# Patient Record
Sex: MALE | Race: ASIAN | NOT HISPANIC OR LATINO | URBAN - METROPOLITAN AREA
[De-identification: names, ages, dates, MRNs, and addresses within clinical notes are randomized per-mention and may not be internally consistent; named-entity substitution may affect disease eponyms.]

---

## 2020-04-03 ENCOUNTER — INPATIENT (INPATIENT)
Facility: HOSPITAL | Age: 50
LOS: 0 days | Discharge: HOME | DRG: 446 | End: 2020-04-04
Attending: TRANSPLANT SURGERY | Admitting: TRANSPLANT SURGERY
Payer: COMMERCIAL

## 2020-04-03 VITALS
WEIGHT: 184.97 LBS | TEMPERATURE: 98 F | RESPIRATION RATE: 19 BRPM | OXYGEN SATURATION: 99 % | SYSTOLIC BLOOD PRESSURE: 141 MMHG | HEIGHT: 71 IN | HEART RATE: 83 BPM | DIASTOLIC BLOOD PRESSURE: 90 MMHG

## 2020-04-03 DIAGNOSIS — K81.0 ACUTE CHOLECYSTITIS: ICD-10-CM

## 2020-04-03 DIAGNOSIS — Z90.49 ACQUIRED ABSENCE OF OTHER SPECIFIED PARTS OF DIGESTIVE TRACT: Chronic | ICD-10-CM

## 2020-04-03 LAB
ALBUMIN SERPL ELPH-MCNC: 4.6 G/DL — SIGNIFICANT CHANGE UP (ref 3.3–5)
ALP SERPL-CCNC: 87 U/L — SIGNIFICANT CHANGE UP (ref 40–120)
ALT FLD-CCNC: 17 U/L — SIGNIFICANT CHANGE UP (ref 10–45)
ANION GAP SERPL CALC-SCNC: 14 MMOL/L — SIGNIFICANT CHANGE UP (ref 5–17)
APTT BLD: 33.9 SEC — SIGNIFICANT CHANGE UP (ref 27.5–36.3)
AST SERPL-CCNC: 20 U/L — SIGNIFICANT CHANGE UP (ref 10–40)
BASOPHILS # BLD AUTO: 0.02 K/UL — SIGNIFICANT CHANGE UP (ref 0–0.2)
BASOPHILS NFR BLD AUTO: 0.2 % — SIGNIFICANT CHANGE UP (ref 0–2)
BILIRUB SERPL-MCNC: 0.9 MG/DL — SIGNIFICANT CHANGE UP (ref 0.2–1.2)
BLD GP AB SCN SERPL QL: NEGATIVE — SIGNIFICANT CHANGE UP
BUN SERPL-MCNC: 17 MG/DL — SIGNIFICANT CHANGE UP (ref 7–23)
CALCIUM SERPL-MCNC: 9.9 MG/DL — SIGNIFICANT CHANGE UP (ref 8.4–10.5)
CHLORIDE SERPL-SCNC: 100 MMOL/L — SIGNIFICANT CHANGE UP (ref 96–108)
CO2 SERPL-SCNC: 24 MMOL/L — SIGNIFICANT CHANGE UP (ref 22–31)
CREAT SERPL-MCNC: 0.88 MG/DL — SIGNIFICANT CHANGE UP (ref 0.5–1.3)
EOSINOPHIL # BLD AUTO: 0.01 K/UL — SIGNIFICANT CHANGE UP (ref 0–0.5)
EOSINOPHIL NFR BLD AUTO: 0.1 % — SIGNIFICANT CHANGE UP (ref 0–6)
GAS PNL BLDV: SIGNIFICANT CHANGE UP
GLUCOSE SERPL-MCNC: 135 MG/DL — HIGH (ref 70–99)
GRAM STN FLD: SIGNIFICANT CHANGE UP
HCT VFR BLD CALC: 45.6 % — SIGNIFICANT CHANGE UP (ref 39–50)
HGB BLD-MCNC: 15.3 G/DL — SIGNIFICANT CHANGE UP (ref 13–17)
IMM GRANULOCYTES NFR BLD AUTO: 0.2 % — SIGNIFICANT CHANGE UP (ref 0–1.5)
INR BLD: 1.06 RATIO — SIGNIFICANT CHANGE UP (ref 0.88–1.16)
LIDOCAIN IGE QN: 25 U/L — SIGNIFICANT CHANGE UP (ref 7–60)
LYMPHOCYTES # BLD AUTO: 1.35 K/UL — SIGNIFICANT CHANGE UP (ref 1–3.3)
LYMPHOCYTES # BLD AUTO: 12.3 % — LOW (ref 13–44)
MCHC RBC-ENTMCNC: 28.7 PG — SIGNIFICANT CHANGE UP (ref 27–34)
MCHC RBC-ENTMCNC: 33.6 GM/DL — SIGNIFICANT CHANGE UP (ref 32–36)
MCV RBC AUTO: 85.4 FL — SIGNIFICANT CHANGE UP (ref 80–100)
MONOCYTES # BLD AUTO: 0.24 K/UL — SIGNIFICANT CHANGE UP (ref 0–0.9)
MONOCYTES NFR BLD AUTO: 2.2 % — SIGNIFICANT CHANGE UP (ref 2–14)
NEUTROPHILS # BLD AUTO: 9.3 K/UL — HIGH (ref 1.8–7.4)
NEUTROPHILS NFR BLD AUTO: 85 % — HIGH (ref 43–77)
NRBC # BLD: 0 /100 WBCS — SIGNIFICANT CHANGE UP (ref 0–0)
PLATELET # BLD AUTO: 253 K/UL — SIGNIFICANT CHANGE UP (ref 150–400)
POTASSIUM SERPL-MCNC: 3.8 MMOL/L — SIGNIFICANT CHANGE UP (ref 3.5–5.3)
POTASSIUM SERPL-SCNC: 3.8 MMOL/L — SIGNIFICANT CHANGE UP (ref 3.5–5.3)
PROT SERPL-MCNC: 8.1 G/DL — SIGNIFICANT CHANGE UP (ref 6–8.3)
PROTHROM AB SERPL-ACNC: 12.1 SEC — SIGNIFICANT CHANGE UP (ref 10–12.9)
RBC # BLD: 5.34 M/UL — SIGNIFICANT CHANGE UP (ref 4.2–5.8)
RBC # FLD: 12.2 % — SIGNIFICANT CHANGE UP (ref 10.3–14.5)
RH IG SCN BLD-IMP: POSITIVE — SIGNIFICANT CHANGE UP
RH IG SCN BLD-IMP: POSITIVE — SIGNIFICANT CHANGE UP
SODIUM SERPL-SCNC: 138 MMOL/L — SIGNIFICANT CHANGE UP (ref 135–145)
SPECIMEN SOURCE: SIGNIFICANT CHANGE UP
WBC # BLD: 10.94 K/UL — HIGH (ref 3.8–10.5)
WBC # FLD AUTO: 10.94 K/UL — HIGH (ref 3.8–10.5)

## 2020-04-03 PROCEDURE — 76705 ECHO EXAM OF ABDOMEN: CPT | Mod: 26,RT

## 2020-04-03 PROCEDURE — 47490 INCISION OF GALLBLADDER: CPT

## 2020-04-03 PROCEDURE — 99221 1ST HOSP IP/OBS SF/LOW 40: CPT

## 2020-04-03 PROCEDURE — 99285 EMERGENCY DEPT VISIT HI MDM: CPT

## 2020-04-03 RX ORDER — ONDANSETRON 8 MG/1
4 TABLET, FILM COATED ORAL ONCE
Refills: 0 | Status: COMPLETED | OUTPATIENT
Start: 2020-04-03 | End: 2020-04-03

## 2020-04-03 RX ORDER — ACETAMINOPHEN 500 MG
975 TABLET ORAL EVERY 6 HOURS
Refills: 0 | Status: DISCONTINUED | OUTPATIENT
Start: 2020-04-03 | End: 2020-04-03

## 2020-04-03 RX ORDER — FAMOTIDINE 10 MG/ML
20 INJECTION INTRAVENOUS ONCE
Refills: 0 | Status: COMPLETED | OUTPATIENT
Start: 2020-04-03 | End: 2020-04-03

## 2020-04-03 RX ORDER — PIPERACILLIN AND TAZOBACTAM 4; .5 G/20ML; G/20ML
3.38 INJECTION, POWDER, LYOPHILIZED, FOR SOLUTION INTRAVENOUS EVERY 8 HOURS
Refills: 0 | Status: DISCONTINUED | OUTPATIENT
Start: 2020-04-03 | End: 2020-04-04

## 2020-04-03 RX ORDER — ACETAMINOPHEN 500 MG
975 TABLET ORAL EVERY 6 HOURS
Refills: 0 | Status: DISCONTINUED | OUTPATIENT
Start: 2020-04-03 | End: 2020-04-04

## 2020-04-03 RX ORDER — MORPHINE SULFATE 50 MG/1
4 CAPSULE, EXTENDED RELEASE ORAL ONCE
Refills: 0 | Status: DISCONTINUED | OUTPATIENT
Start: 2020-04-03 | End: 2020-04-03

## 2020-04-03 RX ORDER — ENOXAPARIN SODIUM 100 MG/ML
40 INJECTION SUBCUTANEOUS DAILY
Refills: 0 | Status: DISCONTINUED | OUTPATIENT
Start: 2020-04-03 | End: 2020-04-04

## 2020-04-03 RX ORDER — OXYCODONE HYDROCHLORIDE 5 MG/1
10 TABLET ORAL EVERY 6 HOURS
Refills: 0 | Status: DISCONTINUED | OUTPATIENT
Start: 2020-04-03 | End: 2020-04-04

## 2020-04-03 RX ORDER — SODIUM CHLORIDE 9 MG/ML
1000 INJECTION, SOLUTION INTRAVENOUS
Refills: 0 | Status: DISCONTINUED | OUTPATIENT
Start: 2020-04-03 | End: 2020-04-03

## 2020-04-03 RX ORDER — PIPERACILLIN AND TAZOBACTAM 4; .5 G/20ML; G/20ML
3.38 INJECTION, POWDER, LYOPHILIZED, FOR SOLUTION INTRAVENOUS ONCE
Refills: 0 | Status: COMPLETED | OUTPATIENT
Start: 2020-04-03 | End: 2020-04-03

## 2020-04-03 RX ORDER — ACETAMINOPHEN 500 MG
650 TABLET ORAL EVERY 6 HOURS
Refills: 0 | Status: DISCONTINUED | OUTPATIENT
Start: 2020-04-03 | End: 2020-04-03

## 2020-04-03 RX ORDER — OXYCODONE HYDROCHLORIDE 5 MG/1
5 TABLET ORAL EVERY 4 HOURS
Refills: 0 | Status: DISCONTINUED | OUTPATIENT
Start: 2020-04-03 | End: 2020-04-04

## 2020-04-03 RX ADMIN — OXYCODONE HYDROCHLORIDE 10 MILLIGRAM(S): 5 TABLET ORAL at 10:57

## 2020-04-03 RX ADMIN — MORPHINE SULFATE 4 MILLIGRAM(S): 50 CAPSULE, EXTENDED RELEASE ORAL at 07:43

## 2020-04-03 RX ADMIN — SODIUM CHLORIDE 75 MILLILITER(S): 9 INJECTION, SOLUTION INTRAVENOUS at 15:55

## 2020-04-03 RX ADMIN — SODIUM CHLORIDE 100 MILLILITER(S): 9 INJECTION, SOLUTION INTRAVENOUS at 10:03

## 2020-04-03 RX ADMIN — Medication 30 MILLILITER(S): at 06:16

## 2020-04-03 RX ADMIN — MORPHINE SULFATE 4 MILLIGRAM(S): 50 CAPSULE, EXTENDED RELEASE ORAL at 06:16

## 2020-04-03 RX ADMIN — PIPERACILLIN AND TAZOBACTAM 3.38 GRAM(S): 4; .5 INJECTION, POWDER, LYOPHILIZED, FOR SOLUTION INTRAVENOUS at 09:00

## 2020-04-03 RX ADMIN — Medication 975 MILLIGRAM(S): at 17:34

## 2020-04-03 RX ADMIN — FAMOTIDINE 20 MILLIGRAM(S): 10 INJECTION INTRAVENOUS at 06:16

## 2020-04-03 RX ADMIN — PIPERACILLIN AND TAZOBACTAM 25 GRAM(S): 4; .5 INJECTION, POWDER, LYOPHILIZED, FOR SOLUTION INTRAVENOUS at 21:13

## 2020-04-03 RX ADMIN — PIPERACILLIN AND TAZOBACTAM 200 GRAM(S): 4; .5 INJECTION, POWDER, LYOPHILIZED, FOR SOLUTION INTRAVENOUS at 08:28

## 2020-04-03 RX ADMIN — ONDANSETRON 4 MILLIGRAM(S): 8 TABLET, FILM COATED ORAL at 06:16

## 2020-04-03 NOTE — H&P ADULT - HISTORY OF PRESENT ILLNESS
[facility] [id number]    HPI:  50 yo otherwise healthy man p/w severe epigastric and RUQ abdominal pain since 11 pm last night. The pain is constant, without radiation, severe, with one episode of associated emesis. He denies fevers, chills, diarrhea, constipation, jaundice, dark urine or pale stools. He has never had pain like this before.    ED Course: afebrile, VSS. Labs wnl, no leukocytosis, LFTs wnl. RUQ US reveals a 2 cm stone in the GB neck and positive sonographic Harris's sign. Received morphine 8 mg, pepcid, zosyn.

## 2020-04-03 NOTE — ED PROVIDER NOTE - PROGRESS NOTE DETAILS
Farideh Cash PGY2  patient reassessed, still has persisting pain, US shows acute cholecystitis. surgery evaluated patient, will admit for IR guided percutaneous removal of stone.

## 2020-04-03 NOTE — ED ADULT NURSE NOTE - NSIMPLEMENTINTERV_GEN_ALL_ED
Implemented All Universal Safety Interventions:  [place] to call system. Call bell, personal items and telephone within reach. Instruct patient to call for assistance. Room bathroom lighting operational. Non-slip footwear when patient is off stretcher. Physically safe environment: no spills, clutter or unnecessary equipment. Stretcher in lowest position, wheels locked, appropriate side rails in place.

## 2020-04-03 NOTE — PROGRESS NOTE ADULT - SUBJECTIVE AND OBJECTIVE BOX
Vascular & Interventional Radiology Post-Procedure Note    Pre-Procedure Diagnosis: Acute Cholecystitis  Post-Procedure Diagnosis: Same as pre.  Indications for Procedure: Large GB neck Calculus     : Zhou HUGHES, Denise HUGHES    Procedure Details/Findings: Successful placement of an 8.5F GB drainage catheter.     Complications: None  Estimated Blood Loss: Minimal  Specimen: 50ml of black-green fluid was aspirated  Contrast: 5ml  Sedation: MAC  Patient Condition/Disposition: Stable. PACU then Floor

## 2020-04-03 NOTE — H&P ADULT - ASSESSMENT
50 yo man presenting with acute cholecystitis.    - admit to [facility], Dr. Santana  - IR consult for perc leslie  - T&S, coags STAT  - IV abx, zosyn  - NPO, IVF  - oral pain control: tylenol, oxy IR  - VTE PPx: lovenox    Patient discussed with Dr. Santana    [facility]  [id number]

## 2020-04-03 NOTE — H&P ADULT - NSHPLABSRESULTS_GEN_ALL_CORE
CBC (04-03 @ 06:31)                              15.3                           10.94<H>  )----------------(  253        85.0<H>% Neutrophils, 12.3<L>% Lymphocytes, ANC: 9.30<H>                              45.6                  BMP (04-03 @ 06:31)             138     |  100     |  17    		Ca++ --      Ca 9.9                ---------------------------------( 135<H>		Mg --                 3.8     |  24      |  0.88  			Ph --        LFTs (04-03 @ 06:31)      TPro 8.1 / Alb 4.6 / TBili 0.9 / DBili -- / AST 20 / ALT 17 / AlkPhos 87      ABG (04-03 @ 06:31)      /  /  /  /  / %     Lactate:   1.9    VBG (04-03 @ 06:31)     7.37 / 51<H> / 30 / 28 / 2.4<H> / 53<L>%      IMAGING:  < from: US Abdomen Upper Quadrant Right (04.03.20 @ 07:11) >    FINDINGS:    Liver: Within normal limits.    Bile ducts: Normal caliber. Common bile duct measures 2 mm.     Gallbladder: Distended with 2.8 cm gallstone at the gallbladder neck. Positive sonographic Harris sign. No wall thickening or pericholecystic free fluid        Pancreas: Visualized portions are within normal limits.    Right kidney: 9.8 cm. No hydronephrosis.    Ascites: None.    IVC: Visualized portions are within normal limits.    IMPRESSION:     Cholelithiasis with distended gallbladder and positive sonographic Harris's sign but without wall thickening or pericholecystic free fluid, the clavicle for acute cholecystitis.     If there is ongoing concern for acute cholecystitis, this could be better evaluated by nuclear medicine HIDA scan.    < end of copied text >

## 2020-04-03 NOTE — H&P ADULT - NSHPPHYSICALEXAM_GEN_ALL_CORE
ICU Vital Signs Last 24 Hrs  T(C): 37 (03 Apr 2020 07:35), Max: 37 (03 Apr 2020 07:35)  T(F): 98.6 (03 Apr 2020 07:35), Max: 98.6 (03 Apr 2020 07:35)  HR: 63 (03 Apr 2020 07:35) (63 - 83)  BP: 127/77 (03 Apr 2020 07:35) (119/79 - 155/87)  BP(mean): --  ABP: --  ABP(mean): --  RR: 18 (03 Apr 2020 07:35) (18 - 19)  SpO2: 100% (03 Apr 2020 07:35) (96% - 100%)    PHYSICAL EXAM:  GEN: NAD, resting quietly  PULM: symmetric chest rise bilaterally, no increased WOB  CV: regular rate, peripheral pulses intact  ABD: soft, non-distended, epigastric and RUQ tenderness, no peritoneal signs; well-healed open appendectomy incision  EXTR: no cyanosis or edema, moving all extremities

## 2020-04-03 NOTE — ED PROVIDER NOTE - ATTENDING CONTRIBUTION TO CARE
pt with sudden onset epigastric/ruq pain with vomiting. on exam, pt is very uncomfortable due to pain with RUQ tenderness but without peritonitis. he is hemodynamically stable. bedisde u/s shows stone at GB neck but no other signs of cholecystitis - will obtain formal ultrasound to ensure no other findings. will send labs, medicate. pt signed out at end of shift with plan to f/u workup as above, re-eval pt, dispo pending workup. differential includes but not limited to pancreatitis, cholecystitis, biliary colic, gastritis, gastroenteritis. lower concern for appy or sbo or colitis.

## 2020-04-03 NOTE — PRE-ANESTHESIA EVALUATION ADULT - NSANTHOSAYNRD_GEN_A_CORE
No. TAHIRA screening performed.  STOP BANG Legend: 0-2 = LOW Risk; 3-4 = INTERMEDIATE Risk; 5-8 = HIGH Risk

## 2020-04-03 NOTE — CHART NOTE - NSCHARTNOTEFT_GEN_A_CORE
Pt returned to [facility] after IR perc. Leslie tube placement  No c/o abdominal pain at this time    Vital Signs Last 24 Hrs  T(C): 37.1 (03 Apr 2020 13:30), Max: 37.1 (03 Apr 2020 10:48)  T(F): 98.8 (03 Apr 2020 12:27), Max: 98.8 (03 Apr 2020 10:48)  HR: 60 (03 Apr 2020 13:30) (60 - 83)  BP: 134/77 (03 Apr 2020 13:30) (119/79 - 155/87)  BP(mean): --  RR: 18 (03 Apr 2020 13:30) (18 - 19)  SpO2: 98% (03 Apr 2020 13:30) (96% - 100%)    Gen: Alert and appropriate, non toxic appearing  Abd soft.  ND NT RUQ biliary drain in place - green bilious output in bag. Dressing site clean and dry      #Ac. cholecystitis s/p IR perc leslie drain placement POD#0  discussed with [facility] team  -Continue IV Zosyn; possible change to PO abx tomorrow  -Ok for PO diet (halal)  -IVF, can d/c once tolerating PO diet  -Pain management; ATC Tylenol with prn oxycodone  -drain teaching  -Monitor drain output Q shift  -D/C planning for tomorrow  -Surgery team will come up to see pt later today      Eagle Rodrigues PA-C    [facility] - [phone]

## 2020-04-03 NOTE — PROGRESS NOTE ADULT - SUBJECTIVE AND OBJECTIVE BOX
Vascular & Interventional Radiology Pre-Procedure Note    Procedure Name: Percutaneous Cholecystostomy    HPI: 49y Male with acute cholecystitis presenting for percutaneous cholecystostomy.     Allergies: NKDA    Medications:    piperacillin/tazobactam IVPB...: 200 mL/Hr IV Intermittent (04-03 @ 08:28)    Data:  180.34  83.9  T(C): 37.1  HR: 63  BP: 126/78  RR: 18  SpO2: 99%    -WBC 10.94 / HgB 15.3 / Hct 45.6 / Plt 253  -Na 138 / Cl 100 / BUN 17 / Glucose 135  -K 3.8 / CO2 24 / Cr 0.88  -ALT 17 / Alk Phos 87 / T.Bili 0.9  -INR1.06    Imaging: US Abdomen on 04/03/2020    Plan:   -49y Male presents for percutaneous cholecystostomy.   -Risks/Benefits/alternatives explained with the patient and witnessed informed consent obtained.   -Percutaneous cholecystostomy is being performed in this particular situation as the patient has calculous cholecystitis and due to the COVID pandemic resulting in a moratorium on surgical procedures such as a laparoscopic cholecystostomy.

## 2020-04-03 NOTE — ED PROVIDER NOTE - NS ED ROS FT
GENERAL: No fever or chills, EYES: no change in vision, HEENT: no trouble speaking, CARDIAC: no chest pain, palpitation PULMONARY: no cough or SOB, GI: + abdominal pain, + nausea, + vomiting, no diarrhea or constipation, : No changes in urination, SKIN: no rashes, NEURO: no headache,  MSK: No muscle pain ~Kendy Arroyo MD

## 2020-04-03 NOTE — CHART NOTE - NSCHARTNOTEFT_GEN_A_CORE
SURGERY POST-PROCEDURE NOTE    S/P: Percutaneous Cholecystostomy    Subjective:   Patient doing well. Denies having any pain. No nausea, no emesis. Tolerating clears. Appreciative of care. Denies fevers, chills, nausea, emesis, chest pain, or shortness of breath.    Objective:   Vital Signs  T(C): 37.1 (04-03 @ 17:44), Max: 37.1 (04-03 @ 10:48)  HR: 61 (04-03 @ 17:44) (60 - 83)  BP: 99/69 (04-03 @ 17:44) (99/69 - 155/87)  RR: 18 (04-03 @ 17:44) (18 - 19)  SpO2: 98% (04-03 @ 17:44) (96% - 100%)  04-03-20 @ 07:01  -  04-03-20 @ 19:37  --------------------------------------------------------  IN: 400 mL / OUT: 0 mL / NET: 400 mL      Physical Exam:  General: alert and oriented, NAD  Resp: airway patent, respirations unlabored  CVS: regular rate and rhythm  Abdomen: soft, nontender, nondistended. Percutaneous cholecystostomy tube in place draining bilious fluid  Extremities: no edema  Skin: warm, dry, appropriate color                          15.3   10.94 )-----------( 253      ( 03 Apr 2020 06:31 )             45.6   04-03    138  |  100  |  17  ----------------------------<  135<H>  3.8   |  24  |  0.88    Ca    9.9      03 Apr 2020 06:31    TPro  8.1  /  Alb  4.6  /  TBili  0.9  /  DBili  x   /  AST  20  /  ALT  17  /  AlkPhos  87  04-03      Assessment:  50 yo man presenting with acute cholecystitis now status post Perc Tona tube placement by IR. Patient tolerated procedure well.     Plan:  - Pain control prn  - continue with CLD tonight, advance in AM  - drain teaching  - F/U AM labs    [facility] [id number]

## 2020-04-03 NOTE — CHART NOTE - NSCHARTNOTEFT_GEN_A_CORE
Due to current COVID pandemic, patient recommended to undergo percutaneous cholecystostomy tube to mitigate risks and resources of undergoing a laparoscopic cholecystectomy at this time.   Discussed with Interventional Radiologist, Dr. Walker  Pager [phone] Due to current COVID pandemic and moratorium of operative procedures, patient recommended to undergo percutaneous cholecystostomy tube to mitigate risks and resources of undergoing a laparoscopic cholecystectomy at this time.     Discussed with Surgery , Dr. Santana  Discussed with on-call Surgeon, Dr. Santana  Discussed with Interventional Radiologist, Dr. Walker    Pager [phone]

## 2020-04-03 NOTE — ED ADULT NURSE NOTE - OBJECTIVE STATEMENT
50 y/o Male presenting to the ED ambulatory, A&Ox3, complaining of RUQ abdominal pain radiating to the back since 11pm last night, pt reports sitting doing nothing when the pain started and it worsened throughout the night. Pt ate soup for dinner, others also ate the food with no symptoms at this time. Pt reports around 2:20am pt called 911 but declined a hospital visit. Pt here now due to increasing pain to a 10/10. Pt appears to be uncomfortable on stretcher. Reports nausea with one episode of vomiting. Denies CP, SOB, diarrhea, constipation, dysuria, hematuria, dark or tarry stools, fever, chills. Abdomen soft, nondistended, generalized tenderness felt with most tenderness found in the RUQ. Safety and comfort measures provided, bed locked and in lowest position, side rails up for safety. Call bell within reach. Awaiting results

## 2020-04-03 NOTE — ED ADULT NURSE REASSESSMENT NOTE - NS ED NURSE REASSESS COMMENT FT1
Pt taken to ultrasound
Received male alert and oriented x3 agitated and grimacing in pain. Pt complaining of pain to mid abd with pain level of 7/10 on pain scale and sharp in quality. Pt denies nausea and active vomiting. Vitals signs stable; awaiting disposition.
Pt awaiting surgical consult .
sub acute rehab

## 2020-04-03 NOTE — H&P ADULT - ATTENDING COMMENTS
history and examination c/w acute cholecystitis  US - large gallstone in Hartmans pouch  LFTs normal    Plan  IVAbx  perc cholecystostomy given covid crisis  patient to followup with me in clinic in 6 weeks to plan for cholecystectomy

## 2020-04-03 NOTE — ED PROVIDER NOTE - CLINICAL SUMMARY MEDICAL DECISION MAKING FREE TEXT BOX
Kendy Arroyo MD: 50 yo M no PMH p/w epigastric abd pain with NBNB n/v x 6 hrs. Pt's abd pain constant radiating to the back. Exam epigastric and ruq ttp. Gall bladder patho vs pancreatitis vs gastritis vs PUD. will get basic labs, lipase, RUQ u/s give GI cocktail, morphine and zofran.

## 2020-04-03 NOTE — ED PROVIDER NOTE - PHYSICAL EXAMINATION
Gen: AAOx3, non-toxic  Head: NCAT  HEENT: EOMI, PERRLA, oral mucosa moist, normal conjunctiva  Lung: CTAB, no respiratory distress, no wheezes/rhonchi/rales B/L, speaking in full sentences  CV: RRR, no murmurs, rubs or gallops  Abd: soft, epigastric and RUQ TTP ND, no guarding, no CVA tenderness, no rebound tenderness  MSK: no visible deformities, full range of motion of all 4 exts  Neuro: No focal sensory or motor deficits  Skin: Warm, well perfused, no rash  Psych: normal affect.   ~Kendy Arroyo MD

## 2020-04-04 ENCOUNTER — TRANSCRIPTION ENCOUNTER (OUTPATIENT)
Age: 50
End: 2020-04-04

## 2020-04-04 VITALS
SYSTOLIC BLOOD PRESSURE: 100 MMHG | HEART RATE: 61 BPM | OXYGEN SATURATION: 98 % | DIASTOLIC BLOOD PRESSURE: 66 MMHG | RESPIRATION RATE: 18 BRPM | TEMPERATURE: 98 F

## 2020-04-04 LAB
ANION GAP SERPL CALC-SCNC: 11 MMOL/L — SIGNIFICANT CHANGE UP (ref 5–17)
BUN SERPL-MCNC: 11 MG/DL — SIGNIFICANT CHANGE UP (ref 7–23)
CALCIUM SERPL-MCNC: 9.2 MG/DL — SIGNIFICANT CHANGE UP (ref 8.4–10.5)
CHLORIDE SERPL-SCNC: 102 MMOL/L — SIGNIFICANT CHANGE UP (ref 96–108)
CO2 SERPL-SCNC: 24 MMOL/L — SIGNIFICANT CHANGE UP (ref 22–31)
CREAT SERPL-MCNC: 0.9 MG/DL — SIGNIFICANT CHANGE UP (ref 0.5–1.3)
GLUCOSE SERPL-MCNC: 105 MG/DL — HIGH (ref 70–99)
HCT VFR BLD CALC: 43.3 % — SIGNIFICANT CHANGE UP (ref 39–50)
HGB BLD-MCNC: 14.3 G/DL — SIGNIFICANT CHANGE UP (ref 13–17)
MAGNESIUM SERPL-MCNC: 2.1 MG/DL — SIGNIFICANT CHANGE UP (ref 1.6–2.6)
MCHC RBC-ENTMCNC: 28.7 PG — SIGNIFICANT CHANGE UP (ref 27–34)
MCHC RBC-ENTMCNC: 33 GM/DL — SIGNIFICANT CHANGE UP (ref 32–36)
MCV RBC AUTO: 86.8 FL — SIGNIFICANT CHANGE UP (ref 80–100)
NRBC # BLD: 0 /100 WBCS — SIGNIFICANT CHANGE UP (ref 0–0)
PHOSPHATE SERPL-MCNC: 2.8 MG/DL — SIGNIFICANT CHANGE UP (ref 2.5–4.5)
PLATELET # BLD AUTO: 228 K/UL — SIGNIFICANT CHANGE UP (ref 150–400)
POTASSIUM SERPL-MCNC: 3.9 MMOL/L — SIGNIFICANT CHANGE UP (ref 3.5–5.3)
POTASSIUM SERPL-SCNC: 3.9 MMOL/L — SIGNIFICANT CHANGE UP (ref 3.5–5.3)
RBC # BLD: 4.99 M/UL — SIGNIFICANT CHANGE UP (ref 4.2–5.8)
RBC # FLD: 12.6 % — SIGNIFICANT CHANGE UP (ref 10.3–14.5)
SODIUM SERPL-SCNC: 137 MMOL/L — SIGNIFICANT CHANGE UP (ref 135–145)
WBC # BLD: 9.11 K/UL — SIGNIFICANT CHANGE UP (ref 3.8–10.5)
WBC # FLD AUTO: 9.11 K/UL — SIGNIFICANT CHANGE UP (ref 3.8–10.5)

## 2020-04-04 PROCEDURE — 80053 COMPREHEN METABOLIC PANEL: CPT

## 2020-04-04 PROCEDURE — 83735 ASSAY OF MAGNESIUM: CPT

## 2020-04-04 PROCEDURE — 87075 CULTR BACTERIA EXCEPT BLOOD: CPT

## 2020-04-04 PROCEDURE — 83605 ASSAY OF LACTIC ACID: CPT

## 2020-04-04 PROCEDURE — 96376 TX/PRO/DX INJ SAME DRUG ADON: CPT

## 2020-04-04 PROCEDURE — 83690 ASSAY OF LIPASE: CPT

## 2020-04-04 PROCEDURE — 47490 INCISION OF GALLBLADDER: CPT

## 2020-04-04 PROCEDURE — 80048 BASIC METABOLIC PNL TOTAL CA: CPT

## 2020-04-04 PROCEDURE — 87205 SMEAR GRAM STAIN: CPT

## 2020-04-04 PROCEDURE — 84295 ASSAY OF SERUM SODIUM: CPT

## 2020-04-04 PROCEDURE — 82435 ASSAY OF BLOOD CHLORIDE: CPT

## 2020-04-04 PROCEDURE — 76705 ECHO EXAM OF ABDOMEN: CPT

## 2020-04-04 PROCEDURE — 82565 ASSAY OF CREATININE: CPT

## 2020-04-04 PROCEDURE — 86900 BLOOD TYPING SEROLOGIC ABO: CPT

## 2020-04-04 PROCEDURE — C1769: CPT

## 2020-04-04 PROCEDURE — 85730 THROMBOPLASTIN TIME PARTIAL: CPT

## 2020-04-04 PROCEDURE — 96374 THER/PROPH/DIAG INJ IV PUSH: CPT

## 2020-04-04 PROCEDURE — 99285 EMERGENCY DEPT VISIT HI MDM: CPT | Mod: 25

## 2020-04-04 PROCEDURE — 87070 CULTURE OTHR SPECIMN AEROBIC: CPT

## 2020-04-04 PROCEDURE — C1729: CPT

## 2020-04-04 PROCEDURE — 82803 BLOOD GASES ANY COMBINATION: CPT

## 2020-04-04 PROCEDURE — 82947 ASSAY GLUCOSE BLOOD QUANT: CPT

## 2020-04-04 PROCEDURE — 87186 SC STD MICRODIL/AGAR DIL: CPT

## 2020-04-04 PROCEDURE — 84100 ASSAY OF PHOSPHORUS: CPT

## 2020-04-04 PROCEDURE — 82330 ASSAY OF CALCIUM: CPT

## 2020-04-04 PROCEDURE — 86850 RBC ANTIBODY SCREEN: CPT

## 2020-04-04 PROCEDURE — 82962 GLUCOSE BLOOD TEST: CPT

## 2020-04-04 PROCEDURE — 84132 ASSAY OF SERUM POTASSIUM: CPT

## 2020-04-04 PROCEDURE — 85014 HEMATOCRIT: CPT

## 2020-04-04 PROCEDURE — 96375 TX/PRO/DX INJ NEW DRUG ADDON: CPT

## 2020-04-04 PROCEDURE — 85027 COMPLETE CBC AUTOMATED: CPT

## 2020-04-04 PROCEDURE — 85610 PROTHROMBIN TIME: CPT

## 2020-04-04 PROCEDURE — 86901 BLOOD TYPING SEROLOGIC RH(D): CPT

## 2020-04-04 RX ORDER — OXYCODONE HYDROCHLORIDE 5 MG/1
1 TABLET ORAL
Qty: 5 | Refills: 0
Start: 2020-04-04

## 2020-04-04 RX ORDER — SODIUM,POTASSIUM PHOSPHATES 278-250MG
1 POWDER IN PACKET (EA) ORAL ONCE
Refills: 0 | Status: COMPLETED | OUTPATIENT
Start: 2020-04-04 | End: 2020-04-04

## 2020-04-04 RX ORDER — ACETAMINOPHEN 500 MG
3 TABLET ORAL
Qty: 0 | Refills: 0 | DISCHARGE
Start: 2020-04-04

## 2020-04-04 RX ADMIN — Medication 975 MILLIGRAM(S): at 14:36

## 2020-04-04 RX ADMIN — Medication 975 MILLIGRAM(S): at 04:58

## 2020-04-04 RX ADMIN — PIPERACILLIN AND TAZOBACTAM 25 GRAM(S): 4; .5 INJECTION, POWDER, LYOPHILIZED, FOR SOLUTION INTRAVENOUS at 13:22

## 2020-04-04 RX ADMIN — PIPERACILLIN AND TAZOBACTAM 25 GRAM(S): 4; .5 INJECTION, POWDER, LYOPHILIZED, FOR SOLUTION INTRAVENOUS at 04:58

## 2020-04-04 RX ADMIN — Medication 1 PACKET(S): at 14:36

## 2020-04-04 RX ADMIN — ENOXAPARIN SODIUM 40 MILLIGRAM(S): 100 INJECTION SUBCUTANEOUS at 13:24

## 2020-04-04 RX ADMIN — Medication 975 MILLIGRAM(S): at 00:09

## 2020-04-04 NOTE — DISCHARGE NOTE PROVIDER - NSDCMRMEDTOKEN_GEN_ALL_CORE_FT
acetaminophen 325 mg oral tablet: 3 tab(s) orally every 6 hours  Vitamin C 1000 mg oral tablet: 1 tab(s) orally once a day acetaminophen 325 mg oral tablet: 3 tab(s) orally every 6 hours  amoxicillin-clavulanate 875 mg-125 mg oral tablet: 1 tab(s) orally 2 times a day MDD:2  oxyCODONE 5 mg oral tablet: 1 tab(s) orally every 4 hours, As needed, Moderate Pain (4 - 6) MDD:4  Vitamin C 1000 mg oral tablet: 1 tab(s) orally once a day

## 2020-04-04 NOTE — DISCHARGE NOTE PROVIDER - CARE PROVIDER_API CALL
Joby Walker)  Interventional Radiology and Diagnostic Radiology  [address] [place] [address]  Phone: [phone]  Fax: [phone]  Follow Up Time: Routine    Tomás Santana)  Surgery  Organ Transplant  [address] [place] [address]  Phone: [phone]  Fax: [phone]  Follow Up Time: Routine

## 2020-04-04 NOTE — PROGRESS NOTE ADULT - ASSESSMENT
48 yo man presenting with acute cholecystitis now status post Perc Tona tube placement by IR. Patient tolerated procedure well.     Plan:  - Pain control prn  - advance LRD this AM  - drain teaching  - F/U AM labs  - discharge today    [facility] [id number].

## 2020-04-04 NOTE — DISCHARGE NOTE PROVIDER - CARE PROVIDERS DIRECT ADDRESSES
[email or web address]

## 2020-04-04 NOTE — DISCHARGE NOTE PROVIDER - HOSPITAL COURSE
49yM w/ no PMH presented to ED on 4/3 with severe epigastric and RUQ abdominal pain x1day, associated with emesis. RUQ US reveals a 2 cm stone in the GB neck and positive sonographic Harris's sign. Pain control was achieved and patient was started on antibiotics (Zosyn).         Percutaneous cholecystostomy was performed in this particular situation as the patient has calculous cholecystitis and due to the COVID pandemic resulting in a moratorium on surgical procedures such as a laparoscopic cholecystostomy. 8.5F GB catheter was successfully placed with notable bilious output.         The patients diet was advanced as tolerated and pain control was transitioned to oral medications.        At the time of discharge, the patient was hemodynamically stable, was tolerating PO diet, was voiding urine and passing stool, was ambulating independently, and was comfortable with adequate pain control.        Patient will be discharged with a continued course of oral antibiotics, Augmentin [id number]. And will follow-up outpatient for evaluation and removal of the drain. Elective cholecystectomy to be preformed in 4-6 weeks.

## 2020-04-04 NOTE — DISCHARGE NOTE PROVIDER - NSDCFUADDINST_GEN_ALL_CORE_FT
DRAIN CARE: Please empty the drain 2 times day.   ACTIVITY: No heavy lifting or straining. Otherwise, you may return to your usual level of physical activity. If you are taking narcotic pain medication (such as Percocet) DO NOT drive a car, operate machinery or make important decisions.  DIET: Return to your usual diet.  NOTIFY YOUR SURGEON IF: You have any bleeding that does not stop, any pus draining from your wound(s), any fever (over 100.4 F) or chills, persistent nausea/vomiting, persistent diarrhea, or if your pain is not controlled on your discharge pain medications.  FOLLOW-UP: Please call do schedule a follow-up appointment with your surgeon, within 7-14 days following discharge.

## 2020-04-04 NOTE — PROGRESS NOTE ADULT - SUBJECTIVE AND OBJECTIVE BOX
INTERVAL EVENTS: Patient admitted with acute cholecystitis with 2cm stone in the neck of GB no s/p perc leslie by IR with resolution of symptoms. Tolerating CLD      SUBJECTIVE: Pt seen and examined at bedside. Continues to tolerate clears. Denies having pain or nausea. OOB to bathroom. Feels confident in his ability to care for drain.       OBJECTIVE:     Vital Signs Last 24 Hrs  T(C): 37.1 (03 Apr 2020 17:44), Max: 37.1 (03 Apr 2020 10:48)  T(F): 98.8 (03 Apr 2020 17:44), Max: 98.8 (03 Apr 2020 10:48)  HR: 61 (03 Apr 2020 17:44) (60 - 83)  BP: 99/69 (03 Apr 2020 17:44) (99/69 - 155/87)  BP(mean): --  RR: 18 (03 Apr 2020 17:44) (18 - 19)  SpO2: 98% (03 Apr 2020 17:44) (96% - 100%)      Physical Exam:  General Appearance: No acute discomfort. Laying in bed.   Neck: Supple  Chest: non-labored breathing, no respiratory distress  CV: regular rate and rhythm   Abdomen: Soft, non-tender, non-distended. Perc Leslie tube draining yellow/green non-viscus fluid  Extremities: warm and well perfused. Distal pulses in tact bilateral lower extremities.       I&O's Summary    03 Apr 2020 07:01  -  04 Apr 2020 05:50  --------------------------------------------------------  IN: 600 mL / OUT: 400 mL / NET: 200 mL      I&O's Detail    03 Apr 2020 07:01  -  04 Apr 2020 05:50  --------------------------------------------------------  IN:    lactated ringers.: 300 mL    Oral Fluid: 300 mL  Total IN: 600 mL    OUT:    Drain: 400 mL  Total OUT: 400 mL    Total NET: 200 mL              LABS:                        15.3   10.94 )-----------( 253      ( 03 Apr 2020 06:31 )             45.6     04-03    138  |  100  |  17  ----------------------------<  135<H>  3.8   |  24  |  0.88    Ca    9.9      03 Apr 2020 06:31    TPro  8.1  /  Alb  4.6  /  TBili  0.9  /  DBili  x   /  AST  20  /  ALT  17  /  AlkPhos  87  04-03    PT/INR - ( 03 Apr 2020 09:59 )   PT: 12.1 sec;   INR: 1.06 ratio         PTT - ( 03 Apr 2020 09:59 )  PTT:33.9 sec          MEDICATIONS  (STANDING):  acetaminophen   Tablet .. 975 milliGRAM(s) Oral every 6 hours  enoxaparin Injectable 40 milliGRAM(s) SubCutaneous daily  piperacillin/tazobactam IVPB.. 3.375 Gram(s) IV Intermittent every 8 hours    MEDICATIONS  (PRN):  oxyCODONE    IR 5 milliGRAM(s) Oral every 4 hours PRN Moderate Pain (4 - 6)  oxyCODONE    IR 10 milliGRAM(s) Oral every 6 hours PRN Severe Pain (7 - 10)          RADIOLOGY & ADDITIONAL STUDIES:    Vascular & Interventional Radiology Post-Procedure Note    Pre-Procedure Diagnosis: Acute Cholecystitis  Post-Procedure Diagnosis: Same as pre.  Indications for Procedure: Large GB neck Calculus     : Zhou HUGHES, Denise HUGHES    Procedure Details/Findings: Successful placement of an 8.5F GB drainage catheter.     Complications: None  Estimated Blood Loss: Minimal  Specimen: 50ml of black-green fluid was aspirated  Contrast: 5ml  Sedation: MAC  Patient Condition/Disposition: Stable. PACU then Floor

## 2020-04-06 LAB
-  AMPICILLIN/SULBACTAM: SIGNIFICANT CHANGE UP
-  CEFAZOLIN: SIGNIFICANT CHANGE UP
-  CLINDAMYCIN: SIGNIFICANT CHANGE UP
-  DAPTOMYCIN: SIGNIFICANT CHANGE UP
-  ERYTHROMYCIN: SIGNIFICANT CHANGE UP
-  GENTAMICIN: SIGNIFICANT CHANGE UP
-  LINEZOLID: SIGNIFICANT CHANGE UP
-  OXACILLIN: SIGNIFICANT CHANGE UP
-  PENICILLIN: SIGNIFICANT CHANGE UP
-  RIFAMPIN: SIGNIFICANT CHANGE UP
-  TETRACYCLINE: SIGNIFICANT CHANGE UP
-  TRIMETHOPRIM/SULFAMETHOXAZOLE: SIGNIFICANT CHANGE UP
-  VANCOMYCIN: SIGNIFICANT CHANGE UP
GLUCOSE BLDC GLUCOMTR-MCNC: 111 MG/DL — HIGH (ref 70–99)
METHOD TYPE: SIGNIFICANT CHANGE UP

## 2020-04-08 LAB
CULTURE RESULTS: SIGNIFICANT CHANGE UP
ORGANISM # SPEC MICROSCOPIC CNT: SIGNIFICANT CHANGE UP
ORGANISM # SPEC MICROSCOPIC CNT: SIGNIFICANT CHANGE UP
SPECIMEN SOURCE: SIGNIFICANT CHANGE UP

## 2020-04-13 PROBLEM — Z00.00 ENCOUNTER FOR PREVENTIVE HEALTH EXAMINATION: Status: ACTIVE | Noted: 2020-04-13

## 2020-04-15 ENCOUNTER — APPOINTMENT (OUTPATIENT)
Dept: TRANSPLANT | Facility: CLINIC | Age: 50
End: 2020-04-15
Payer: COMMERCIAL

## 2020-04-15 PROCEDURE — 99214 OFFICE O/P EST MOD 30 MIN: CPT | Mod: 95

## 2020-04-15 NOTE — PLAN
[FreeTextEntry1] : 48yo M sp cholecystostomy tube placement.  Appears well.\par \par - plan to shoot PTC study in 2wks and cap tube accordingly.  Discussed with Dr. Larsen and . \par - will f/u with him via telehealth 1wk after this\par - elective cholecystectomy once OR availability improves.

## 2020-04-15 NOTE — HISTORY OF PRESENT ILLNESS
[de-identified] : 48yo M managed as inpatient for acute cholecystitis.  Had cholecystostomy tube placed in setting of decreased OR utilization 2/2 COVID crisis.   Was discharged home on abx. \par \par He reports feeling well since d/c.  Minimal tube output (50-70cc/day). Tolerating diet.  No jaundice/icterus. Some pain from tube when lying on his side, but it is manageable.\par \par

## 2020-04-15 NOTE — PHYSICAL EXAM
[de-identified] : NAD [de-identified] : breathing comfortably on RA [de-identified] : Cholecystostomy tube site CDI.  Clean appearing bile in bag.

## 2020-04-22 ENCOUNTER — APPOINTMENT (OUTPATIENT)
Dept: TRANSPLANT | Facility: CLINIC | Age: 50
End: 2020-04-22
Payer: COMMERCIAL

## 2020-04-22 PROCEDURE — 99212 OFFICE O/P EST SF 10 MIN: CPT | Mod: 95

## 2020-04-22 NOTE — PHYSICAL EXAM
[Alert] : alert [Oriented to Person] : oriented to person [Oriented to Place] : oriented to place [Oriented to Time] : oriented to time [de-identified] : NAD [de-identified] : breathing comfortably on RA [de-identified] : Minimal erythema surrounding the tube, non-blanching.  He could not express any fluid.  No blistering.  Black/brown bile in cholecystostomy drainage bag.

## 2020-04-22 NOTE — HISTORY OF PRESENT ILLNESS
[Other Location: e.g. Home (Enter Location, City,State)___] : at [unfilled] [de-identified] : 50yo M managed as inpatient for acute cholecystitis.  Had cholecystostomy tube placed in setting of decreased OR utilization 2/2 COVID crisis.   Was discharged home on abx which stopped >1wk ago. \par \par Today he was seen via telehealth to evaluate "redness surrounding the tube."  He has had no fevers or chills. He has noticed scant discharge on the dressing during changes which he reports is malodorous.  He otherwise feels well.  [Home] : at home, [unfilled] , at the time of the visit. [Patient] : the patient [Self] : self

## 2020-04-22 NOTE — PLAN
[FreeTextEntry1] : 50yo M sp cholecystostomy tube placement. \par \par - site does not appear infected, however he was counseled to call back if there is worsening erythema/pain/fever or purulent drainage.\par \par - plan to shoot PTC study on 4/30 and cap tube accordingly.\par \par - Will get LFTs prior to next appointment 1wk after the tube check is capped\par \par - Elective lap leslie\par \par

## 2020-05-04 ENCOUNTER — APPOINTMENT (OUTPATIENT)
Dept: ENDOVASCULAR SURGERY | Facility: CLINIC | Age: 50
End: 2020-05-04
Payer: COMMERCIAL

## 2020-05-04 VITALS
SYSTOLIC BLOOD PRESSURE: 126 MMHG | HEART RATE: 66 BPM | HEIGHT: 69 IN | DIASTOLIC BLOOD PRESSURE: 77 MMHG | OXYGEN SATURATION: 98 % | RESPIRATION RATE: 15 BRPM | WEIGHT: 185 LBS | TEMPERATURE: 98.4 F | BODY MASS INDEX: 27.4 KG/M2

## 2020-05-04 DIAGNOSIS — Z78.9 OTHER SPECIFIED HEALTH STATUS: ICD-10-CM

## 2020-05-04 PROCEDURE — 47531Z: CUSTOM

## 2020-05-04 NOTE — REASON FOR VISIT
[Other ___] : a [unfilled] visit for [Family Member] : family member [FreeTextEntry2] : Chlolecystotomy tube check

## 2020-05-04 NOTE — PAST MEDICAL HISTORY
[Increasing age ( >40 years old)] : Increasing age ( >40 years old) [No therapy indicated for cases scheduled for less than one hour] : No therapy indicated for cases scheduled for less than one hour. [FreeTextEntry1] : \par \par Malignant Hyperthermis (MH) Screening Tool and Risk of Bleeding Assessement\par Mr. GABE SANCHEZ  denies family history of unexpected death following Anesthesia or Exercise.\par Denies Family history of Malignant Hyperthermia, Muscle or Neuromuscular disorder and High Temperature following exercise.\par \par Mr. GABE SANCHEZ denies history of Muscle Spasm, Dark or Chocolate - Colored urine and Unanticipated fever immediately following anesthesia or serious exercise. \par Mr. SANCHEZ  also denies bleeding tendencies/ Risks of Bleeding\par

## 2020-05-04 NOTE — HISTORY OF PRESENT ILLNESS
[FreeTextEntry1] : alert and oriented x 3 \par no reported falls\par feels ok [FreeTextEntry5] : 5 am [FreeTextEntry4] : N/A [FreeTextEntry6] : Dr Power

## 2020-05-04 NOTE — ASSESSMENT
[FreeTextEntry1] : cholecystitis\par no fever, no chills, no complaints\par here for possible capping trial\par informed consent on chart

## 2020-05-06 ENCOUNTER — APPOINTMENT (OUTPATIENT)
Dept: TRANSPLANT | Facility: CLINIC | Age: 50
End: 2020-05-06
Payer: COMMERCIAL

## 2020-05-06 DIAGNOSIS — K81.0 ACUTE CHOLECYSTITIS: ICD-10-CM

## 2020-05-06 PROCEDURE — 99214 OFFICE O/P EST MOD 30 MIN: CPT | Mod: 95

## 2020-05-06 NOTE — PHYSICAL EXAM
[Alert] : alert [Oriented to Person] : oriented to person [Oriented to Place] : oriented to place [Oriented to Time] : oriented to time [de-identified] : NAD [de-identified] : breathing comfortably on RA [de-identified] : Minimal erythema surrounding the tube, non-blanching.  He could not express any fluid.  No blistering.  Black/brown bile in cholecystostomy drainage bag.

## 2020-05-06 NOTE — PLAN
[FreeTextEntry1] : 48yo M sp cholecystostomy tube placement.  Tube recently capped.\par \par He will monitor symptoms today and will attach to drainage bag if he continues to have pain.  Discussed delay of cholecystectomy 2/2 COVID crisis.  Will try to add on case since likely can be done in an ambulatory fashion. \par \par We will f/u on Friday. \par \par

## 2020-05-06 NOTE — HISTORY OF PRESENT ILLNESS
[de-identified] : 48yo M managed as inpatient for acute cholecystitis.  Had cholecystostomy tube placed in setting of decreased OR utilization 2/2 COVID crisis.   Was discharged home on abx.  Finished course.  Had C-tube study yesterday which demonstrated large stone in mid-body of GB.  Prompt uptake of contrast into the CBD/RHD/LHD w no evidence of biliary dilataion. \par \par Tube was capped.  Since then he has had sharp pain, worse with laughing/moving.  He is tolerating a diet (eats large meals at 4AM/8PM 2/2 Ramadan).  No fevers/chills/diarrhea/redness around tube.   [Home] : at home, [unfilled] , at the time of the visit. [Other Location: e.g. Home (Enter Location, City,State)___] : at [unfilled] [Patient] : the patient [Self] : self

## 2020-06-12 ENCOUNTER — OUTPATIENT (OUTPATIENT)
Dept: OUTPATIENT SERVICES | Facility: HOSPITAL | Age: 50
LOS: 1 days | End: 2020-06-12
Payer: COMMERCIAL

## 2020-06-12 VITALS
WEIGHT: 186.95 LBS | RESPIRATION RATE: 14 BRPM | TEMPERATURE: 98 F | DIASTOLIC BLOOD PRESSURE: 74 MMHG | HEART RATE: 75 BPM | SYSTOLIC BLOOD PRESSURE: 111 MMHG | OXYGEN SATURATION: 98 % | HEIGHT: 69 IN

## 2020-06-12 DIAGNOSIS — Z90.49 ACQUIRED ABSENCE OF OTHER SPECIFIED PARTS OF DIGESTIVE TRACT: Chronic | ICD-10-CM

## 2020-06-12 DIAGNOSIS — Z29.9 ENCOUNTER FOR PROPHYLACTIC MEASURES, UNSPECIFIED: ICD-10-CM

## 2020-06-12 DIAGNOSIS — K80.20 CALCULUS OF GALLBLADDER WITHOUT CHOLECYSTITIS WITHOUT OBSTRUCTION: Chronic | ICD-10-CM

## 2020-06-12 DIAGNOSIS — Z01.818 ENCOUNTER FOR OTHER PREPROCEDURAL EXAMINATION: ICD-10-CM

## 2020-06-12 DIAGNOSIS — K81.0 ACUTE CHOLECYSTITIS: ICD-10-CM

## 2020-06-12 DIAGNOSIS — Z87.19 PERSONAL HISTORY OF OTHER DISEASES OF THE DIGESTIVE SYSTEM: ICD-10-CM

## 2020-06-12 DIAGNOSIS — G47.33 OBSTRUCTIVE SLEEP APNEA (ADULT) (PEDIATRIC): ICD-10-CM

## 2020-06-12 LAB
ALBUMIN SERPL ELPH-MCNC: 4.5 G/DL — SIGNIFICANT CHANGE UP (ref 3.3–5)
ALP SERPL-CCNC: 94 U/L — SIGNIFICANT CHANGE UP (ref 40–120)
ALT FLD-CCNC: 38 U/L — SIGNIFICANT CHANGE UP (ref 10–45)
ANION GAP SERPL CALC-SCNC: 15 MMOL/L — SIGNIFICANT CHANGE UP (ref 5–17)
AST SERPL-CCNC: 26 U/L — SIGNIFICANT CHANGE UP (ref 10–40)
BILIRUB SERPL-MCNC: 0.7 MG/DL — SIGNIFICANT CHANGE UP (ref 0.2–1.2)
BUN SERPL-MCNC: 15 MG/DL — SIGNIFICANT CHANGE UP (ref 7–23)
CALCIUM SERPL-MCNC: 9.6 MG/DL — SIGNIFICANT CHANGE UP (ref 8.4–10.5)
CHLORIDE SERPL-SCNC: 102 MMOL/L — SIGNIFICANT CHANGE UP (ref 96–108)
CO2 SERPL-SCNC: 21 MMOL/L — LOW (ref 22–31)
CREAT SERPL-MCNC: 0.89 MG/DL — SIGNIFICANT CHANGE UP (ref 0.5–1.3)
GLUCOSE SERPL-MCNC: 92 MG/DL — SIGNIFICANT CHANGE UP (ref 70–99)
HCT VFR BLD CALC: 44.3 % — SIGNIFICANT CHANGE UP (ref 39–50)
HGB BLD-MCNC: 15.4 G/DL — SIGNIFICANT CHANGE UP (ref 13–17)
MCHC RBC-ENTMCNC: 30 PG — SIGNIFICANT CHANGE UP (ref 27–34)
MCHC RBC-ENTMCNC: 34.8 GM/DL — SIGNIFICANT CHANGE UP (ref 32–36)
MCV RBC AUTO: 86.2 FL — SIGNIFICANT CHANGE UP (ref 80–100)
NRBC # BLD: 0 /100 WBCS — SIGNIFICANT CHANGE UP (ref 0–0)
PLATELET # BLD AUTO: 251 K/UL — SIGNIFICANT CHANGE UP (ref 150–400)
POTASSIUM SERPL-MCNC: 4.1 MMOL/L — SIGNIFICANT CHANGE UP (ref 3.5–5.3)
POTASSIUM SERPL-SCNC: 4.1 MMOL/L — SIGNIFICANT CHANGE UP (ref 3.5–5.3)
PROT SERPL-MCNC: 8.1 G/DL — SIGNIFICANT CHANGE UP (ref 6–8.3)
RBC # BLD: 5.14 M/UL — SIGNIFICANT CHANGE UP (ref 4.2–5.8)
RBC # FLD: 12.5 % — SIGNIFICANT CHANGE UP (ref 10.3–14.5)
SODIUM SERPL-SCNC: 138 MMOL/L — SIGNIFICANT CHANGE UP (ref 135–145)
WBC # BLD: 8.76 K/UL — SIGNIFICANT CHANGE UP (ref 3.8–10.5)
WBC # FLD AUTO: 8.76 K/UL — SIGNIFICANT CHANGE UP (ref 3.8–10.5)

## 2020-06-12 PROCEDURE — G0463: CPT

## 2020-06-12 PROCEDURE — 80053 COMPREHEN METABOLIC PANEL: CPT

## 2020-06-12 PROCEDURE — 85027 COMPLETE CBC AUTOMATED: CPT

## 2020-06-12 RX ORDER — CEFAZOLIN SODIUM 1 G
2000 VIAL (EA) INJECTION ONCE
Refills: 0 | Status: DISCONTINUED | OUTPATIENT
Start: 2020-06-19 | End: 2020-06-19

## 2020-06-12 NOTE — H&P PST ADULT - GASTROINTESTINAL COMMENTS
s/p cholecystostomy drain cholecystostomy tube in place draining cholecystostomy tube in place draining biliary fluid

## 2020-06-12 NOTE — H&P PST ADULT - NSICDXPASTMEDICALHX_GEN_ALL_CORE_FT
PAST MEDICAL HISTORY:  H/O cholecystitis PAST MEDICAL HISTORY:  H/O cholecystitis with stone in gallbladder neck    TAHIRA (obstructive sleep apnea) non-compliant with CPAP

## 2020-06-12 NOTE — H&P PST ADULT - HISTORY OF PRESENT ILLNESS
51 yo otherwise healthy man p/w severe epigastric and RUQ abdominal pain since 11 pm last night. The pain is constant, without radiation, severe, with one episode of associated emesis. He denies fevers, chills, diarrhea, constipation, jaundice, dark urine or pale stools. He has never had pain like this before. S/P cholecystostomy tube placement on 4/3/20    ED Course: afebrile, VSS. Labs wnl, no leukocytosis, LFTs wnl. RUQ US reveals a 2 cm stone in the GB neck and positive sonographic Harris's sign. Received morphine 8 mg, pepcid, zosyn. 49 y/o male with no significant medical history, TAHIRA (non-compliant with CPAP), c/o severe epigastric and RUQ abdominal pain with episode of emesis and went to [facility] ER April 2020. Diagnostic imaging revealed a 2cm gallstone in the gall bladder neck.  S/P cholecystostomy tube placement on 4/3/20. Pt denies fevers, chills, diarrhea, constipation, jaundice, dark urine or pale stools. Evaluated by Dr. Bingham. Presents to PST for a scheduled laparoscopic cholecystectomy, possible open cholecystectomy with intra-operative cholangiography on 6/19/2020.

## 2020-06-12 NOTE — H&P PST ADULT - ATTENDING COMMENTS
51yo M with h/o acute cholecystitis during COVID19 pandemic peak.  He had a cholecystostomy tube placed.  Since, attempts at capping tube have been unsuccessful.  He now presents for laparoscopic cholecystectomy, possible open, with possible intraoperative cholangiography.  We discussed the risks and benefits of the procedure.  Risks included but were not limited to bleeding, infection, and damage to adjacent structures including the burke hepatis.   Benefits include cholecystostomy tube removal and prevention of further episodes of cholecystitis.   All of the patients questions were answered.  Surgical consent was signed.

## 2020-06-12 NOTE — H&P PST ADULT - NSICDXPROBLEM_GEN_ALL_CORE_FT
PROBLEM DIAGNOSES  Problem: H/O cholecystitis  Assessment and Plan: Scheduled     Problem: Need for prophylactic measure  Assessment and Plan: The Caprini score indicates this patient is at risk for a VTE event (score 3-5).  Most surgical patients in this group would benefit from pharmacologic prophylaxis.  The surgical team will determine the balance between VTE risk and bleeding risk PROBLEM DIAGNOSES  Problem: H/O cholecystitis  Assessment and Plan: Scheduled laparoscopic cholecystectomy, possible open cholecystectomy with intra-operative cholangiography on 6/19/2020  Pre-op instructions given to pt, chlorhexidine soap provided  lab work sent at [facility]    Problem: TAHIRA (obstructive sleep apnea)  Assessment and Plan: OR booking notified    Problem: Need for prophylactic measure  Assessment and Plan: The Caprini score indicates this patient is at risk for a VTE event (score 3-5).  Most surgical patients in this group would benefit from pharmacologic prophylaxis.  The surgical team will determine the balance between VTE risk and bleeding risk

## 2020-06-12 NOTE — H&P PST ADULT - NSICDXPASTSURGICALHX_GEN_ALL_CORE_FT
PAST SURGICAL HISTORY:  Calculus, biliary s/p cholecystostomy drain 4/2020    History of appendectomy 2013

## 2020-06-12 NOTE — H&P PST ADULT - ASSESSMENT
CAPRINI SCORE [CLOT updated 18]    AGE RELATED RISK FACTORS                                                       MOBILITY RELATED FACTORS  [x] Age 41-60 years                                            (1 Point)                    [ ] Bed rest                                                        (1 Point)  [ ] Age: 61-74 years                                           (2 Points)                  [ ] Plaster cast                                                   (2 Points)  [ ] Age= 75 years                                              (3 Points)                    [ ] Bed bound for more than 72 hours                 (2 Points)    DISEASE RELATED RISK FACTORS                                               GENDER SPECIFIC FACTORS  [ ] Edema in the lower extremities                       (1 Point)              [ ] Pregnancy                                                     (1 Point)  [ ] Varicose veins                                               (1 Point)                     [ ] Post-partum < 6 weeks                                   (1 Point)             [x] BMI > 25 Kg/m2                                            (1 Point)                     [ ] Hormonal therapy  or oral contraception          (1 Point)                 [ ] Sepsis (in the previous month)                        (1 Point)               [ ] History of pregnancy complications                 (1 point)  [ ] Pneumonia or serious lung disease                                               [ ] Unexplained or recurrent                     (1 Point)           (in the previous month)                               (1 Point)  [ ] Abnormal pulmonary function test                     (1 Point)                 SURGERY RELATED RISK FACTORS  [ ] Acute myocardial infarction                              (1 Point)               [ ]  Section                                             (1 Point)  [ ] Congestive heart failure (in the previous month)  (1 Point)      [ ] Minor surgery                                                  (1 Point)   [ ] Inflammatory bowel disease                             (1 Point)               [ ] Arthroscopic surgery                                        (2 Points)  [ ] Central venous access                                      (2 Points)                [x] General surgery lasting more than 45 minutes (2 points)  [ ] Present or previous malignancy                     (2 Points)                [ ] Elective arthroplasty                                         (5 points)    [ ] Stroke (in the previous month)                          (5 Points)                                                                                                                                                           HEMATOLOGY RELATED FACTORS                                                 TRAUMA RELATED RISK FACTORS  [ ] Prior episodes of VTE                                     (3 Points)                [ ] Fracture of the hip, pelvis, or leg                       (5 Points)  [ ] Positive family history for VTE                         (3 Points)             [ ] Acute spinal cord injury (in the previous month)  (5 Points)  [ ] Prothrombin 80583 A                                     (3 Points)               [ ] Paralysis  (less than 1 month)                             (5 Points)  [ ] Factor V Leiden                                             (3 Points)                  [ ] Multiple Trauma within 1 month                        (5 Points)  [ ] Lupus anticoagulants                                     (3 Points)                                                           [ ] Anticardiolipin antibodies                               (3 Points)                                                       [ ] High homocysteine in the blood                      (3 Points)                                             [ ] Other congenital or acquired thrombophilia      (3 Points)                                                [ ] Heparin induced thrombocytopenia                  (3 Points)                                     Total Score [    4      ]

## 2020-06-16 ENCOUNTER — OUTPATIENT (OUTPATIENT)
Dept: OUTPATIENT SERVICES | Facility: HOSPITAL | Age: 50
LOS: 1 days | End: 2020-06-16
Payer: COMMERCIAL

## 2020-06-16 DIAGNOSIS — Z90.49 ACQUIRED ABSENCE OF OTHER SPECIFIED PARTS OF DIGESTIVE TRACT: Chronic | ICD-10-CM

## 2020-06-16 DIAGNOSIS — Z11.59 ENCOUNTER FOR SCREENING FOR OTHER VIRAL DISEASES: ICD-10-CM

## 2020-06-16 DIAGNOSIS — K80.20 CALCULUS OF GALLBLADDER WITHOUT CHOLECYSTITIS WITHOUT OBSTRUCTION: Chronic | ICD-10-CM

## 2020-06-16 PROBLEM — Z87.19 PERSONAL HISTORY OF OTHER DISEASES OF THE DIGESTIVE SYSTEM: Chronic | Status: ACTIVE | Noted: 2020-06-12

## 2020-06-16 PROBLEM — G47.33 OBSTRUCTIVE SLEEP APNEA (ADULT) (PEDIATRIC): Chronic | Status: ACTIVE | Noted: 2020-06-12

## 2020-06-16 PROCEDURE — U0003: CPT

## 2020-06-17 LAB — SARS-COV-2 RNA SPEC QL NAA+PROBE: SIGNIFICANT CHANGE UP

## 2020-06-18 ENCOUNTER — TRANSCRIPTION ENCOUNTER (OUTPATIENT)
Age: 50
End: 2020-06-18

## 2020-06-19 ENCOUNTER — RESULT REVIEW (OUTPATIENT)
Age: 50
End: 2020-06-19

## 2020-06-19 ENCOUNTER — APPOINTMENT (OUTPATIENT)
Dept: TRANSPLANT | Facility: HOSPITAL | Age: 50
End: 2020-06-19

## 2020-06-19 ENCOUNTER — INPATIENT (INPATIENT)
Facility: HOSPITAL | Age: 50
LOS: 0 days | Discharge: HOME | DRG: 419 | End: 2020-06-19
Attending: TRANSPLANT SURGERY | Admitting: TRANSPLANT SURGERY
Payer: COMMERCIAL

## 2020-06-19 VITALS
RESPIRATION RATE: 16 BRPM | TEMPERATURE: 98 F | OXYGEN SATURATION: 98 % | DIASTOLIC BLOOD PRESSURE: 78 MMHG | HEIGHT: 69 IN | WEIGHT: 186.95 LBS | HEART RATE: 69 BPM | SYSTOLIC BLOOD PRESSURE: 111 MMHG

## 2020-06-19 VITALS
TEMPERATURE: 98 F | HEART RATE: 76 BPM | SYSTOLIC BLOOD PRESSURE: 128 MMHG | RESPIRATION RATE: 16 BRPM | DIASTOLIC BLOOD PRESSURE: 80 MMHG | OXYGEN SATURATION: 95 %

## 2020-06-19 DIAGNOSIS — Z90.49 ACQUIRED ABSENCE OF OTHER SPECIFIED PARTS OF DIGESTIVE TRACT: Chronic | ICD-10-CM

## 2020-06-19 DIAGNOSIS — K81.0 ACUTE CHOLECYSTITIS: ICD-10-CM

## 2020-06-19 DIAGNOSIS — K80.20 CALCULUS OF GALLBLADDER WITHOUT CHOLECYSTITIS WITHOUT OBSTRUCTION: Chronic | ICD-10-CM

## 2020-06-19 PROCEDURE — 47562 LAPAROSCOPIC CHOLECYSTECTOMY: CPT | Mod: GC

## 2020-06-19 PROCEDURE — 88304 TISSUE EXAM BY PATHOLOGIST: CPT

## 2020-06-19 PROCEDURE — C1889: CPT

## 2020-06-19 PROCEDURE — 88304 TISSUE EXAM BY PATHOLOGIST: CPT | Mod: 26

## 2020-06-19 PROCEDURE — 86850 RBC ANTIBODY SCREEN: CPT

## 2020-06-19 PROCEDURE — 86900 BLOOD TYPING SEROLOGIC ABO: CPT

## 2020-06-19 PROCEDURE — 86901 BLOOD TYPING SEROLOGIC RH(D): CPT

## 2020-06-19 RX ORDER — LIDOCAINE HCL 20 MG/ML
0.2 VIAL (ML) INJECTION ONCE
Refills: 0 | Status: DISCONTINUED | OUTPATIENT
Start: 2020-06-19 | End: 2020-06-19

## 2020-06-19 RX ORDER — OXYCODONE AND ACETAMINOPHEN 5; 325 MG/1; MG/1
1 TABLET ORAL
Qty: 8 | Refills: 0
Start: 2020-06-19 | End: 2020-06-20

## 2020-06-19 RX ORDER — ASCORBIC ACID 60 MG
1 TABLET,CHEWABLE ORAL
Qty: 0 | Refills: 0 | DISCHARGE

## 2020-06-19 RX ORDER — HYDROMORPHONE HYDROCHLORIDE 2 MG/ML
0.5 INJECTION INTRAMUSCULAR; INTRAVENOUS; SUBCUTANEOUS
Refills: 0 | Status: DISCONTINUED | OUTPATIENT
Start: 2020-06-19 | End: 2020-06-19

## 2020-06-19 RX ORDER — CHLORHEXIDINE GLUCONATE 213 G/1000ML
1 SOLUTION TOPICAL ONCE
Refills: 0 | Status: DISCONTINUED | OUTPATIENT
Start: 2020-06-19 | End: 2020-06-19

## 2020-06-19 RX ORDER — ONDANSETRON 8 MG/1
4 TABLET, FILM COATED ORAL ONCE
Refills: 0 | Status: DISCONTINUED | OUTPATIENT
Start: 2020-06-19 | End: 2020-06-19

## 2020-06-19 RX ORDER — SODIUM CHLORIDE 9 MG/ML
1000 INJECTION, SOLUTION INTRAVENOUS
Refills: 0 | Status: DISCONTINUED | OUTPATIENT
Start: 2020-06-19 | End: 2020-06-19

## 2020-06-19 RX ORDER — CEPHALEXIN 500 MG
1 CAPSULE ORAL
Qty: 10 | Refills: 0
Start: 2020-06-19 | End: 2020-06-23

## 2020-06-19 RX ORDER — SODIUM CHLORIDE 9 MG/ML
3 INJECTION INTRAMUSCULAR; INTRAVENOUS; SUBCUTANEOUS EVERY 8 HOURS
Refills: 0 | Status: DISCONTINUED | OUTPATIENT
Start: 2020-06-19 | End: 2020-06-19

## 2020-06-19 RX ADMIN — HYDROMORPHONE HYDROCHLORIDE 0.5 MILLIGRAM(S): 2 INJECTION INTRAMUSCULAR; INTRAVENOUS; SUBCUTANEOUS at 10:33

## 2020-06-19 NOTE — BRIEF OPERATIVE NOTE - NSICDXBRIEFPOSTOP_GEN_ALL_CORE_FT
POST-OP DIAGNOSIS:  Acute cholecystitis 19-Jun-2020 10:35:37  Ivana Mcdaniel  Acute cholecystitis 19-Jun-2020 10:34:49  Ivana Mcdaniel

## 2020-06-19 NOTE — PRE-ANESTHESIA EVALUATION ADULT - NSANTHPMHFT_GEN_ALL_CORE
50M PMH of TAHIRA (non-compliant with CPAP) p/w severe epigastric and RUQ abdominal pain with episode of emesis and went to [facility] April 2020. Imaging revealed a 2cm gallstone in the gall bladder neck.  S/P cholecystostomy tube placement on 4/3/20. Pt scheduled laparoscopic cholecystectomy, possible open cholecystectomy with intra-operative cholangiography.

## 2020-06-19 NOTE — ASU DISCHARGE PLAN (ADULT/PEDIATRIC) - CARE PROVIDER_API CALL
Raza Bingham I  SURGERY  [address] [place], [place] [address]  Phone: [phone]  Fax: [phone]  Follow Up Time: 2 weeks

## 2020-06-19 NOTE — BRIEF OPERATIVE NOTE - OPERATION/FINDINGS
Chronically inflamed gallbladder with percutaneous cholecystostomy in place. Critical view obtained. Cystic duct and artery clipped with 10mm clips. PCT removed. Large stone palpable within gallbladder

## 2020-07-01 ENCOUNTER — APPOINTMENT (OUTPATIENT)
Dept: TRANSPLANT | Facility: CLINIC | Age: 50
End: 2020-07-01
Payer: COMMERCIAL

## 2020-07-01 VITALS
HEART RATE: 65 BPM | BODY MASS INDEX: 28.44 KG/M2 | RESPIRATION RATE: 13 BRPM | WEIGHT: 192 LBS | TEMPERATURE: 98.7 F | SYSTOLIC BLOOD PRESSURE: 126 MMHG | HEIGHT: 69 IN | OXYGEN SATURATION: 98 % | DIASTOLIC BLOOD PRESSURE: 77 MMHG

## 2020-07-01 DIAGNOSIS — Z90.49 ACQUIRED ABSENCE OF OTHER SPECIFIED PARTS OF DIGESTIVE TRACT: ICD-10-CM

## 2020-07-01 PROCEDURE — 99024 POSTOP FOLLOW-UP VISIT: CPT

## 2020-07-01 NOTE — ASSESSMENT
[FreeTextEntry1] : 51yo M sp lap leslie with cholecystostomy catheter\par \par - doing well. path reviewed.  can f/u PRN.

## 2020-07-01 NOTE — PHYSICAL EXAM
[Alert] : alert [Oriented to Person] : oriented to person [Oriented to Place] : oriented to place [de-identified] : NAD [de-identified] : breathing comfortably [de-identified] : soft, nt/nd\par \par incisions CDI

## 2020-07-01 NOTE — HISTORY OF PRESENT ILLNESS
[de-identified] : 51yo sp lap leslie for acute cholecystitis with cholecystostomy catheter.\par \par In interim has done well. Tolerating diet.  Some pain with sitting up.  No analgesic requirements.   No incisional issue.s

## 2021-05-12 NOTE — ED ADULT NURSE NOTE - SUICIDE SCREENING DEPRESSION
Negative Complex Repair And Flap Additional Text (Will Appearing After The Standard Complex Repair Text): The complex repair was not sufficient to completely close the primary defect. The remaining additional defect was repaired with the flap mentioned below.

## 2021-11-30 NOTE — ED PROCEDURE NOTE - PROCEDURE DATE TIME, MLM
The patient is Stable - Low risk of patient condition declining or worsening    Shift Goals  Clinical Goals: Patient repositioned every 2 hours; Wound care  Patient Goals: Rest  Family Goals: No family present    Progress made toward(s) clinical / shift goals:  Patient was repositioned every 2 hours. Wound care was done this shift.      Problem: Skin Integrity  Goal: Skin integrity is maintained or improved  Outcome: Progressing     Problem: Psychosocial  Goal: Patient's ability to verbalize feelings about condition will improve  Outcome: Progressing        03-Apr-2020 06:31

## 2022-05-30 NOTE — DISCHARGE NOTE PROVIDER - PROVIDER TOKENS
05/30/22 1737   Reason for Consult   Reason for Consult Initial assessment;Distraction   Diagnosis/Procedure Procedural/surgery   Assisting During Procedure Wound:   Wound: Laceration repair   Patient Intervention(s)   Type of Intervention Performed Procedural support   Procedural Support Intervention(s) Distraction;Positive touch;Verbal reassurance   Evaluation   Patient Behaviors Pre-Interventions Upset;Anxious;Tearful   Patient Behaviors During Interventions Anxious;Upset  (Patient intermittently able to redirect but maintained vocalized distress throughout.)   Patient Behaviors Post-Intervention(s) Return to baseline   Persons Present Family;Staff   Evaluation/Plan of Care No follow-up planned     Yulissa Kamara MS, CCLS      PROVIDER:[TOKEN:[[id number]],FOLLOWUP:[Routine]],PROVIDER:[TOKEN:[[id number]],FOLLOWUP:[Routine]]

## 2023-09-19 ENCOUNTER — EMERGENCY (EMERGENCY)
Facility: HOSPITAL | Age: 53
LOS: 1 days | Discharge: HOME | End: 2023-09-19
Attending: STUDENT IN AN ORGANIZED HEALTH CARE EDUCATION/TRAINING PROGRAM | Admitting: EMERGENCY MEDICINE
Payer: COMMERCIAL

## 2023-09-19 VITALS
HEART RATE: 73 BPM | RESPIRATION RATE: 16 BRPM | SYSTOLIC BLOOD PRESSURE: 133 MMHG | OXYGEN SATURATION: 100 % | TEMPERATURE: 98 F | DIASTOLIC BLOOD PRESSURE: 94 MMHG

## 2023-09-19 DIAGNOSIS — K80.20 CALCULUS OF GALLBLADDER WITHOUT CHOLECYSTITIS WITHOUT OBSTRUCTION: Chronic | ICD-10-CM

## 2023-09-19 DIAGNOSIS — Z90.49 ACQUIRED ABSENCE OF OTHER SPECIFIED PARTS OF DIGESTIVE TRACT: Chronic | ICD-10-CM

## 2023-09-19 LAB
BASE EXCESS BLDV CALC-SCNC: 1.9 MMOL/L — SIGNIFICANT CHANGE UP (ref -2–3)
BASOPHILS # BLD AUTO: 0.06 K/UL — SIGNIFICANT CHANGE UP (ref 0–0.2)
BASOPHILS NFR BLD AUTO: 0.7 % — SIGNIFICANT CHANGE UP (ref 0–2)
BLOOD GAS VENOUS COMPREHENSIVE RESULT: SIGNIFICANT CHANGE UP
CHLORIDE BLDV-SCNC: 103 MMOL/L — SIGNIFICANT CHANGE UP (ref 96–108)
CO2 BLDV-SCNC: 30.3 MMOL/L — HIGH (ref 22–26)
EOSINOPHIL # BLD AUTO: 0.15 K/UL — SIGNIFICANT CHANGE UP (ref 0–0.5)
EOSINOPHIL NFR BLD AUTO: 1.7 % — SIGNIFICANT CHANGE UP (ref 0–6)
GAS PNL BLDV: 137 MMOL/L — SIGNIFICANT CHANGE UP (ref 136–145)
GLUCOSE BLDV-MCNC: 98 MG/DL — SIGNIFICANT CHANGE UP (ref 70–99)
HCO3 BLDV-SCNC: 29 MMOL/L — SIGNIFICANT CHANGE UP (ref 22–29)
HCT VFR BLD CALC: 44.5 % — SIGNIFICANT CHANGE UP (ref 39–50)
HCT VFR BLDA CALC: 46 % — SIGNIFICANT CHANGE UP (ref 39–51)
HGB BLD CALC-MCNC: 15.3 G/DL — SIGNIFICANT CHANGE UP (ref 12.6–17.4)
HGB BLD-MCNC: 15.1 G/DL — SIGNIFICANT CHANGE UP (ref 13–17)
IANC: 3.99 K/UL — SIGNIFICANT CHANGE UP (ref 1.8–7.4)
IMM GRANULOCYTES NFR BLD AUTO: 0.3 % — SIGNIFICANT CHANGE UP (ref 0–0.9)
LACTATE BLDV-MCNC: 1.3 MMOL/L — SIGNIFICANT CHANGE UP (ref 0.5–2)
LYMPHOCYTES # BLD AUTO: 3.88 K/UL — HIGH (ref 1–3.3)
LYMPHOCYTES # BLD AUTO: 43.4 % — SIGNIFICANT CHANGE UP (ref 13–44)
MCHC RBC-ENTMCNC: 29.1 PG — SIGNIFICANT CHANGE UP (ref 27–34)
MCHC RBC-ENTMCNC: 33.9 GM/DL — SIGNIFICANT CHANGE UP (ref 32–36)
MCV RBC AUTO: 85.7 FL — SIGNIFICANT CHANGE UP (ref 80–100)
MONOCYTES # BLD AUTO: 0.82 K/UL — SIGNIFICANT CHANGE UP (ref 0–0.9)
MONOCYTES NFR BLD AUTO: 9.2 % — SIGNIFICANT CHANGE UP (ref 2–14)
NEUTROPHILS # BLD AUTO: 3.99 K/UL — SIGNIFICANT CHANGE UP (ref 1.8–7.4)
NEUTROPHILS NFR BLD AUTO: 44.7 % — SIGNIFICANT CHANGE UP (ref 43–77)
NRBC # BLD: 0 /100 WBCS — SIGNIFICANT CHANGE UP (ref 0–0)
NRBC # FLD: 0 K/UL — SIGNIFICANT CHANGE UP (ref 0–0)
PCO2 BLDV: 52 MMHG — SIGNIFICANT CHANGE UP (ref 42–55)
PH BLDV: 7.35 — SIGNIFICANT CHANGE UP (ref 7.32–7.43)
PLATELET # BLD AUTO: 272 K/UL — SIGNIFICANT CHANGE UP (ref 150–400)
PO2 BLDV: 26 MMHG — SIGNIFICANT CHANGE UP (ref 25–45)
POTASSIUM BLDV-SCNC: 4.3 MMOL/L — SIGNIFICANT CHANGE UP (ref 3.5–5.1)
RBC # BLD: 5.19 M/UL — SIGNIFICANT CHANGE UP (ref 4.2–5.8)
RBC # FLD: 12.6 % — SIGNIFICANT CHANGE UP (ref 10.3–14.5)
SAO2 % BLDV: 40.8 % — LOW (ref 67–88)
WBC # BLD: 8.93 K/UL — SIGNIFICANT CHANGE UP (ref 3.8–10.5)
WBC # FLD AUTO: 8.93 K/UL — SIGNIFICANT CHANGE UP (ref 3.8–10.5)

## 2023-09-19 PROCEDURE — 99285 EMERGENCY DEPT VISIT HI MDM: CPT

## 2023-09-19 PROCEDURE — 93010 ELECTROCARDIOGRAM REPORT: CPT

## 2023-09-19 RX ORDER — ACETAMINOPHEN 500 MG
650 TABLET ORAL ONCE
Refills: 0 | Status: COMPLETED | OUTPATIENT
Start: 2023-09-19 | End: 2023-09-19

## 2023-09-19 RX ADMIN — Medication 650 MILLIGRAM(S): at 23:47

## 2023-09-19 NOTE — ED ADULT TRIAGE NOTE - CHIEF COMPLAINT QUOTE
Patient brought to [facility] by [facility]([address]) from side [address]. Patient was feeling chest pain, Shortness of breath, and dizzyness x 1 day. Tried to drive himself to the hospital but got too dizzy. Was given asa 324 and 02 2 liters via nasal cannula administered by EMS. Fingerstick 110 No previous medical history. No medication. Was taking antibiotic for shingles for 2 days. Was stopped for allergic reaction. Patient states that the lesions are still oozing on right side. Patient brought to [facility] by [facility]([address]) from side [address]. Patient was feeling chest pain, Shortness of breath, and dizzyness x 1 day. Tried to drive himself to the hospital but got too dizzy. Was given asa 324 and 02 2 liters via nasal cannula administered by EMS. Fingerstick at scene 110. No previous medical history. No medication. Was taking antibiotic for shingles for 2 days. Was stopped for allergic reaction. Patient states that the lesions are still oozing on right side. Fiingerstick at triage 109.

## 2023-09-19 NOTE — ED PROVIDER NOTE - NSFOLLOWUPCLINICS_GEN_ALL_ED_FT
Cardiology at [facility]  Cardiology  [address] [place], [place] [address]  Phone: [phone]  Fax:
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
## 2023-09-19 NOTE — ED PROVIDER NOTE - PATIENT PORTAL LINK FT
You can access the FollowMyHealth Patient Portal offered by [facility] by registering at the following website: [email or web address]. By joining [facility]’s FollowMyHealth portal, you will also be able to view your health information using other applications (apps) compatible with our system.

## 2023-09-19 NOTE — ED PROVIDER NOTE - PROGRESS NOTE DETAILS
Kenyon Nixon MD  XR w/o acute fracture or dislocation. Labs nonactionable. Patient stable for DC with PCP and cards follow up and strict return precautions. Patient amenable.
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
## 2023-09-19 NOTE — ED PROVIDER NOTE - NSFOLLOWUPINSTRUCTIONS_ED_ALL_ED_FT
1. You presented to the emergency department for: shoulder and chest pain. You were not found to have a fracture, dislocation, cardiac injury, or other condition requiring further emergency treatment or hospitalization. You can take Ibuprofen or Tylenol every 6-8 hours as needed for pain as well as use cold compresses and rest. You should follow up with your primary care provider for further evaluation and management.     2. Your evaluation in the emergency department included a physician evaluation. Your work-up did not reveal any findings indicating the need for admission to the hospital or any emergent interventions at this time.     3. If needed, to arrange an appointment with a primary care provider please call: [phone]    4. Please continue taking your regular medications as prescribed.     For pain you may take 400-600 mg IBUPROFEN or 500-1000mg ACETAMINOPHEN every 6-8 hours - as needed.  This is an over-the-counter medication - please read the instructions for use and warnings on the label. If you have any questions regarding its use, you may refer them to your local pharmacist.    5. PLEASE RETURN TO THE EMERGENCY DEPARTMENT IMMEDIATELY IF you develop any fevers not responding to over the counter medications, uncontrollable nausea and vomiting, an inability to tolerate eating and drinking, difficulty breathing, chest pain, a severe increase in your symptoms or pain, or any other new symptoms that concern you. 1. You presented to the emergency department for: shoulder and chest pain. You were not found to have a fracture, dislocation, cardiac injury, or other condition requiring further emergency treatment or hospitalization. You can take Ibuprofen or Tylenol every 6-8 hours as needed for pain as well as use cold compresses and rest. You should follow up with your primary care provider for further evaluation and management.     Cardiology at [facility]  Cardiology  [address] [place] [id number]  Phone: [phone]  Fax:     2. Your evaluation in the emergency department included a physician evaluation. Your work-up did not reveal any findings indicating the need for admission to the hospital or any emergent interventions at this time.     3. If needed, to arrange an appointment with a primary care provider please call: [phone]    4. Please continue taking your regular medications as prescribed.     For pain you may take 400-600 mg IBUPROFEN or 500-1000mg ACETAMINOPHEN every 6-8 hours - as needed.  This is an over-the-counter medication - please read the instructions for use and warnings on the label. If you have any questions regarding its use, you may refer them to your local pharmacist.    5. PLEASE RETURN TO THE EMERGENCY DEPARTMENT IMMEDIATELY IF you develop any fevers not responding to over the counter medications, uncontrollable nausea and vomiting, an inability to tolerate eating and drinking, difficulty breathing, chest pain, a severe increase in your symptoms or pain, or any other new symptoms that concern you.

## 2023-09-19 NOTE — ED PROVIDER NOTE - NS ED ROS FT
General: denies fever, chills  Eyes: denies visual changes, blurred vision  CV: +chest pain, +palpitations  Resp: +difficulty breathing, denies cough  Abdominal: denies nausea, vomiting, diarrhea, abdominal pain  : denies urinary pain or discharge  MSK: +R shoulder pain, denies muscle aches, leg swelling  Neuro: denies headaches, numbness, tingling  Skin: denies rashes, bruises

## 2023-09-19 NOTE — ED PROVIDER NOTE - NSICDXPASTMEDICALHX_GEN_ALL_CORE_FT
PAST MEDICAL HISTORY:  H/O cholecystitis with stone in gallbladder neck    TAHIRA (obstructive sleep apnea) non-compliant with CPAP

## 2023-09-19 NOTE — ED ADULT NURSE NOTE - NSFALLUNIVINTERV_ED_ALL_ED
Bed/Stretcher in lowest position, wheels locked, appropriate side rails in place/Call bell, personal items and telephone in reach/Instruct patient to call for assistance before getting out of bed/chair/stretcher/Non-slip footwear applied when patient is off stretcher/[place] to call system/Physically safe environment - no spills, clutter or unnecessary equipment/Purposeful proactive rounding/Room/bathroom lighting operational, light cord in reach

## 2023-09-19 NOTE — ED PROVIDER NOTE - PHYSICAL EXAMINATION
GENERAL: well appearing in no acute distress, non-toxic appearing  HEAD: normocephalic, atraumatic  HEENT: normal conjunctiva, oral mucosa moist, neck supple, no JVD  CARDIAC: regular rate and rhythm, normal S1S2, no appreciable murmurs, 2+ pulses in UE/LE b/l  PULM: normal breath sounds, clear to ascultation bilaterally, no rales, rhonchi, wheezing  GI: abdomen nondistended, soft, nontender, no guarding, rebound tenderness  NEURO: no focal motor or sensory deficits, normal speech, normal gait, AAOx3  MSK: +R posterior shoulder TTP, full ROM, 5/5 strength in all extremities, no peripheral edema, no calf tenderness b/l  SKIN: +3cm patch of fluid filled vesicles without open lesions on R abdominal wall, well-perfused, extremities warm  PSYCH: appropriate mood and affect

## 2023-09-20 VITALS
TEMPERATURE: 99 F | SYSTOLIC BLOOD PRESSURE: 113 MMHG | HEART RATE: 61 BPM | OXYGEN SATURATION: 100 % | RESPIRATION RATE: 16 BRPM | DIASTOLIC BLOOD PRESSURE: 75 MMHG

## 2023-09-20 LAB
ALBUMIN SERPL ELPH-MCNC: 4.4 G/DL — SIGNIFICANT CHANGE UP (ref 3.3–5)
ALP SERPL-CCNC: 79 U/L — SIGNIFICANT CHANGE UP (ref 40–120)
ALT FLD-CCNC: 22 U/L — SIGNIFICANT CHANGE UP (ref 4–41)
ANION GAP SERPL CALC-SCNC: 11 MMOL/L — SIGNIFICANT CHANGE UP (ref 7–14)
AST SERPL-CCNC: 17 U/L — SIGNIFICANT CHANGE UP (ref 4–40)
BILIRUB SERPL-MCNC: 0.7 MG/DL — SIGNIFICANT CHANGE UP (ref 0.2–1.2)
BUN SERPL-MCNC: 16 MG/DL — SIGNIFICANT CHANGE UP (ref 7–23)
CALCIUM SERPL-MCNC: 9.3 MG/DL — SIGNIFICANT CHANGE UP (ref 8.4–10.5)
CHLORIDE SERPL-SCNC: 104 MMOL/L — SIGNIFICANT CHANGE UP (ref 98–107)
CO2 SERPL-SCNC: 25 MMOL/L — SIGNIFICANT CHANGE UP (ref 22–31)
CREAT SERPL-MCNC: 1.05 MG/DL — SIGNIFICANT CHANGE UP (ref 0.5–1.3)
EGFR: 85 ML/MIN/1.73M2 — SIGNIFICANT CHANGE UP
GLUCOSE SERPL-MCNC: 95 MG/DL — SIGNIFICANT CHANGE UP (ref 70–99)
POTASSIUM SERPL-MCNC: 4.1 MMOL/L — SIGNIFICANT CHANGE UP (ref 3.5–5.3)
POTASSIUM SERPL-SCNC: 4.1 MMOL/L — SIGNIFICANT CHANGE UP (ref 3.5–5.3)
PROT SERPL-MCNC: 7.5 G/DL — SIGNIFICANT CHANGE UP (ref 6–8.3)
SODIUM SERPL-SCNC: 140 MMOL/L — SIGNIFICANT CHANGE UP (ref 135–145)
TROPONIN T, HIGH SENSITIVITY RESULT: <6 NG/L — SIGNIFICANT CHANGE UP

## 2023-09-20 PROCEDURE — 73030 X-RAY EXAM OF SHOULDER: CPT | Mod: 26,RT

## 2023-09-20 PROCEDURE — 71046 X-RAY EXAM CHEST 2 VIEWS: CPT | Mod: 26

## 2023-09-20 RX ORDER — DIAZEPAM 5 MG
5 TABLET ORAL ONCE
Refills: 0 | Status: DISCONTINUED | OUTPATIENT
Start: 2023-09-20 | End: 2023-09-20

## 2023-09-20 RX ORDER — LIDOCAINE 4 G/100G
1 CREAM TOPICAL ONCE
Refills: 0 | Status: COMPLETED | OUTPATIENT
Start: 2023-09-20 | End: 2023-09-20

## 2023-09-20 RX ORDER — KETOROLAC TROMETHAMINE 30 MG/ML
15 SYRINGE (ML) INJECTION ONCE
Refills: 0 | Status: DISCONTINUED | OUTPATIENT
Start: 2023-09-20 | End: 2023-09-20

## 2023-09-20 RX ADMIN — Medication 15 MILLIGRAM(S): at 01:08

## 2023-09-20 RX ADMIN — Medication 5 MILLIGRAM(S): at 01:08

## 2023-09-20 RX ADMIN — LIDOCAINE 1 PATCH: 4 CREAM TOPICAL at 01:08

## 2023-09-20 RX ADMIN — Medication 650 MILLIGRAM(S): at 00:45

## 2024-08-12 NOTE — ED PROVIDER NOTE - CADM POA PRESS ULCER
Recent PHQ 2/9 Score    PHQ 2:  PHQ 2 Score Adult PHQ 2 Score Adult PHQ 2 Interpretation Little interest or pleasure in activity?   8/12/2024   8:02 AM 0 No further screening needed 0       PHQ 9:        No